# Patient Record
Sex: FEMALE | Race: WHITE | NOT HISPANIC OR LATINO | Employment: OTHER | URBAN - NONMETROPOLITAN AREA
[De-identification: names, ages, dates, MRNs, and addresses within clinical notes are randomized per-mention and may not be internally consistent; named-entity substitution may affect disease eponyms.]

---

## 2017-02-03 ENCOUNTER — OFFICE VISIT (OUTPATIENT)
Dept: OBSTETRICS AND GYNECOLOGY | Facility: CLINIC | Age: 52
End: 2017-02-03

## 2017-02-03 VITALS
HEIGHT: 64 IN | SYSTOLIC BLOOD PRESSURE: 158 MMHG | DIASTOLIC BLOOD PRESSURE: 60 MMHG | WEIGHT: 208 LBS | BODY MASS INDEX: 35.51 KG/M2

## 2017-02-03 DIAGNOSIS — Z01.419 ENCOUNTER FOR GYNECOLOGICAL EXAMINATION WITHOUT ABNORMAL FINDING: Primary | ICD-10-CM

## 2017-02-03 PROCEDURE — 99386 PREV VISIT NEW AGE 40-64: CPT | Performed by: OBSTETRICS & GYNECOLOGY

## 2017-02-03 NOTE — PROGRESS NOTES
Subjective   Chief Complaint   Patient presents with   • Gynecologic Exam     Last pap  WNL Mammogram  Normal     Kristyn Thomas is a 51 y.o. year old  (SVDx 1, C/S x 1) presenting to be seen for her annual exam.  PCP Reymundo, PAP , MMG , GSUI, Ablation --no periods since then. LABS reviewed and noted sig elevate TG and Cholesterol.   SEXUAL Hx:  She is currently sexually active.  In the past year there has not been new sexual partners.    Condoms are not typically used.  She would not like to be screened for STD's at today's exam.  Current birth control method: abstinence.  MENSTRUAL Hx:  No LMP recorded. Patient has had an ablation.  In the past 6 months her cycles have been absent.   Her menstrual flow is absent.   Each month on average there are roughly 0 days of very heavy flow.    Intermenstrual bleeding is absent.    Post-coital bleeding is absent.  Dysmenorrhea: none and is not affecting her activities of daily living  PMS: none and is not affecting her activities of daily living  Her cycles are not a source of concern for her that she wishes to discuss today.  HEALTH Hx:  She exercises regularly: no (and has no plans to become more active).  She wears her seat belt:yes.  She has concerns about domestic violence: no.  OTHER COMPLAINTS:  none    The following portions of the patient's history were reviewed and updated as appropriate:problem list, current medications, allergies, past family history, past medical history, past social history and past surgical history.    Smoking status: Never Smoker                                                                 Smokeless status: Not on file                       Review of Systems  Consitutional NEG: anorexia or night sweats    POS: nothing reported   Gastointestinal NEG: bloating, change in bowel habits, melena or reflux symptoms    POS: nothing reported   Genitourinary NEG: dysuria or hematuria    POS: nothing reported   Integument  "NEG: moles that are changing in size, shape, color or rashes    POS: nothing reported   Breast NEG: persistent breast lump, skin dimpling or nipple discharge    POS: nothing reported          Objective   Visit Vitals   • /60   • Ht 64\" (162.6 cm)   • Wt 208 lb (94.3 kg)   • BMI 35.7 kg/m2       General:  well developed; well nourished  no acute distress   Skin:  No suspicious lesions seen   Thyroid: normal to inspection and palpation   Breasts:  Examined in supine position  Symmetric without masses or skin dimpling  Nipples normal without inversion, lesions or discharge  There are no palpable axillary nodes   Abdomen: soft, non-tender; no masses  no umbilical or inginual hernias are present  no hepato-splenomegaly   Pelvis: Clinical staff was present for exam  External genitalia:  normal appearance of the external genitalia including [place]th[place]'s and [place]'s glands.  :  urethral meatus normal; urethral hypermobility is absent.  Vaginal:  normal pink mucosa without prolapse or lesions.  Cervix:  normal appearance.  Uterus:  normal size, shape and consistency. asymmetrically enlarged, consistent with 10 weeks size;  Adnexa:  enlarged adnexa, left - see above;        Assessment   1. Normal PE     Plan   1. PAP done, TVS 4 weeks to eval h/o fibroids  2. Meds were prescribed - no  3.     No orders of the defined types were placed in this encounter.         This note was electronically signed.      February 3, 2017    "

## 2017-02-03 NOTE — PATIENT INSTRUCTIONS
Health Maintenance, Female  Adopting a healthy lifestyle and getting preventive care can go a long way to promote health and wellness. Talk with your health care provider about what schedule of regular examinations is right for you. This is a good chance for you to check in with your provider about disease prevention and staying healthy.  In between checkups, there are plenty of things you can do on your own. Experts have done a lot of research about which lifestyle changes and preventive measures are most likely to keep you healthy. Ask your health care provider for more information.  WEIGHT AND DIET   Eat a healthy diet  · Be sure to include plenty of vegetables, fruits, low-fat dairy products, and lean protein.  · Do not eat a lot of foods high in solid fats, added sugars, or salt.  · Get regular exercise. This is one of the most important things you can do for your health.  ¨ Most adults should exercise for at least 150 minutes each week. The exercise should increase your heart rate and make you sweat (moderate-intensity exercise).  ¨ Most adults should also do strengthening exercises at least twice a week. This is in addition to the moderate-intensity exercise.    Maintain a healthy weight  · Body mass index (BMI) is a measurement that can be used to identify possible weight problems. It estimates body fat based on height and weight. Your health care provider can help determine your BMI and help you achieve or maintain a healthy weight.  · For females 20 years of age and older:      A BMI below 18.5 is considered underweight.    A BMI of 18.5 to 24.9 is normal.    A BMI of 25 to 29.9 is considered overweight.    A BMI of 30 and above is considered obese.    Watch levels of cholesterol and blood lipids  · You should start having your blood tested for lipids and cholesterol at 20 years of age, then have this test every 5 years.  · You may need to have your cholesterol levels checked more often if:  ¨ Your lipid  or cholesterol levels are high.  ¨ You are older than 50 years of age.  ¨ You are at high risk for heart disease.    CANCER SCREENING      Lung Cancer  · Lung cancer screening is recommended for adults 55-80 years old who are at high risk for lung cancer because of a history of smoking.  · A yearly low-dose CT scan of the lungs is recommended for people who:  ¨ Currently smoke.  ¨ Have quit within the past 15 years.  ¨ Have at least a 30-pack-year history of smoking. A pack year is smoking an average of one pack of cigarettes a day for 1 year.  · Yearly screening should continue until it has been 15 years since you quit.  · Yearly screening should stop if you develop a health problem that would prevent you from having lung cancer treatment.    Breast Cancer  · Practice breast self-awareness. This means understanding how your breasts normally appear and feel.  · It also means doing regular breast self-exams. Let your health care provider know about any changes, no matter how small.  · If you are in your 20s or 30s, you should have a clinical breast exam (CBE) by a health care provider every 1-3 years as part of a regular health exam.  · If you are 40 or older, have a CBE every year. Also consider having a breast X-ray (mammogram) every year.  · If you have a family history of breast cancer, talk to your health care provider about genetic screening.  · If you are at high risk for breast cancer, talk to your health care provider about having an MRI and a mammogram every year.  · Breast cancer gene (BRCA) assessment is recommended for women who have family members with BRCA-related cancers. BRCA-related cancers include:  ¨ Breast.  ¨ Ovarian.  ¨ Tubal.  ¨ Peritoneal cancers.  · Results of the assessment will determine the need for genetic counseling and BRCA1 and BRCA2 testing.  Cervical Cancer  Your health care provider may recommend that you be screened regularly for cancer of the pelvic organs (ovaries, uterus, and  vagina). This screening involves a pelvic examination, including checking for microscopic changes to the surface of your cervix (Pap test). You may be encouraged to have this screening done every 3 years, beginning at age 21.  · For women ages 30-65, health care providers may recommend pelvic exams and Pap testing every 3 years, or they may recommend the Pap and pelvic exam, combined with testing for human papilloma virus (HPV), every 5 years. Some types of HPV increase your risk of cervical cancer. Testing for HPV may also be done on women of any age with unclear Pap test results.  · Other health care providers may not recommend any screening for nonpregnant women who are considered low risk for pelvic cancer and who do not have symptoms. Ask your health care provider if a screening pelvic exam is right for you.  · If you have had past treatment for cervical cancer or a condition that could lead to cancer, you need Pap tests and screening for cancer for at least 20 years after your treatment. If Pap tests have been discontinued, your risk factors (such as having a new sexual partner) need to be reassessed to determine if screening should resume. Some women have medical problems that increase the chance of getting cervical cancer. In these cases, your health care provider may recommend more frequent screening and Pap tests.  Colorectal Cancer  · This type of cancer can be detected and often prevented.  · Routine colorectal cancer screening usually begins at 50 years of age and continues through 75 years of age.  · Your health care provider may recommend screening at an earlier age if you have risk factors for colon cancer.  · Your health care provider may also recommend using home test kits to check for hidden blood in the stool.  · A small camera at the end of a tube can be used to examine your colon directly (sigmoidoscopy or colonoscopy). This is done to check for the earliest forms of colorectal  cancer.  · Routine screening usually begins at age 50.  · Direct examination of the colon should be repeated every 5-10 years through 75 years of age. However, you may need to be screened more often if early forms of precancerous polyps or small growths are found.  Skin Cancer  · Check your skin from head to toe regularly.  · Tell your health care provider about any new moles or changes in moles, especially if there is a change in a mole's shape or color.  · Also tell your health care provider if you have a mole that is larger than the size of a pencil eraser.  · Always use sunscreen. Apply sunscreen liberally and repeatedly throughout the day.  · Protect yourself by wearing long sleeves, pants, a wide-brimmed hat, and sunglasses whenever you are outside.  HEART DISEASE, DIABETES, AND HIGH BLOOD PRESSURE   · High blood pressure causes heart disease and increases the risk of stroke. High blood pressure is more likely to develop in:    People who have blood pressure in the high end of the normal range (130-139/85-89 mm Hg).    People who are overweight or obese.    People who are .  · If you are 18-39 years of age, have your blood pressure checked every 3-5 years. If you are 40 years of age or older, have your blood pressure checked every year. You should have your blood pressure measured twice--once when you are at a hospital or clinic, and once when you are not at a hospital or clinic. Record the average of the two measurements. To check your blood pressure when you are not at a hospital or clinic, you can use:    An automated blood pressure machine at a pharmacy.    A home blood pressure monitor.  · If you are between 55 years and 79 years old, ask your health care provider if you should take aspirin to prevent strokes.  · Have regular diabetes screenings. This involves taking a blood sample to check your fasting blood sugar level.    If you are at a normal weight and have a low risk for diabetes,  have this test once every three years after 45 years of age.    If you are overweight and have a high risk for diabetes, consider being tested at a younger age or more often.  PREVENTING INFECTION   Hepatitis B  · If you have a higher risk for hepatitis B, you should be screened for this virus. You are considered at high risk for hepatitis B if:    You were born in a country where hepatitis B is common. Ask your health care provider which countries are considered high risk.    Your parents were born in a high-risk country, and you have not been immunized against hepatitis B (hepatitis B vaccine).    You have HIV or AIDS.    You use needles to inject street drugs.    You live with someone who has hepatitis B.    You have had sex with someone who has hepatitis B.    You get hemodialysis treatment.    You take certain medicines for conditions, including cancer, organ transplantation, and autoimmune conditions.  Hepatitis C  · Blood testing is recommended for:  ¨ Everyone born from 1945 through 1965.  ¨ Anyone with known risk factors for hepatitis C.  Sexually transmitted infections (STIs)  · You should be screened for sexually transmitted infections (STIs) including gonorrhea and chlamydia if:  ¨ You are sexually active and are younger than 24 years of age.  ¨ You are older than 24 years of age and your health care provider tells you that you are at risk for this type of infection.  ¨ Your sexual activity has changed since you were last screened and you are at an increased risk for chlamydia or gonorrhea. Ask your health care provider if you are at risk.    If you do not have HIV, but are at risk, it may be recommended that you take a prescription medicine daily to prevent HIV infection. This is called pre-exposure prophylaxis (PrEP). You are considered at risk if:    You are sexually active and do not regularly use condoms or know the HIV status of your partner(s).    You take drugs by injection.    You are sexually  active with a partner who has HIV.  Talk with your health care provider about whether you are at high risk of being infected with HIV. If you choose to begin PrEP, you should first be tested for HIV. You should then be tested every 3 months for as long as you are taking PrEP.   PREGNANCY   · If you are premenopausal and you may become pregnant, ask your health care provider about preconception counseling.  · If you may become pregnant, take 400 to 800 micrograms (mcg) of folic acid every day.  · If you want to prevent pregnancy, talk to your health care provider about birth control (contraception).  OSTEOPOROSIS AND MENOPAUSE   · Osteoporosis is a disease in which the bones lose minerals and strength with aging. This can result in serious bone fractures. Your risk for osteoporosis can be identified using a bone density scan.  · If you are 65 years of age or older, or if you are at risk for osteoporosis and fractures, ask your health care provider if you should be screened.  · Ask your health care provider whether you should take a calcium or vitamin D supplement to lower your risk for osteoporosis.  · Menopause may have certain physical symptoms and risks.  · Hormone replacement therapy may reduce some of these symptoms and risks.  Talk to your health care provider about whether hormone replacement therapy is right for you.   HOME CARE INSTRUCTIONS   · Schedule regular health, dental, and eye exams.  · Stay current with your immunizations.    · Do not use any tobacco products including cigarettes, chewing tobacco, or electronic cigarettes.  · If you are pregnant, do not drink alcohol.  · If you are breastfeeding, limit how much and how often you drink alcohol.  · Limit alcohol intake to no more than 1 drink per day for nonpregnant women. One drink equals 12 ounces of beer, 5 ounces of wine, or 1½ ounces of hard liquor.  · Do not use street drugs.  · Do not share needles.  · Ask your health care provider for help if  you need support or information about quitting drugs.  · Tell your health care provider if you often feel depressed.  · Tell your health care provider if you have ever been abused or do not feel safe at home.     This information is not intended to replace advice given to you by your health care provider. Make sure you discuss any questions you have with your health care provider.     Document Released: 07/02/2012 Document Revised: 01/08/2016 Document Reviewed: 11/19/2014  [employer] Interactive Patient Education ©2016 [employer]

## 2017-02-28 ENCOUNTER — HOSPITAL ENCOUNTER (OUTPATIENT)
Dept: MAMMOGRAPHY | Facility: HOSPITAL | Age: 52
Discharge: HOME | End: 2017-02-28
Attending: OBSTETRICS & GYNECOLOGY | Admitting: OBSTETRICS & GYNECOLOGY

## 2017-02-28 DIAGNOSIS — Z01.419 ENCOUNTER FOR GYNECOLOGICAL EXAMINATION WITHOUT ABNORMAL FINDING: ICD-10-CM

## 2017-02-28 PROCEDURE — G0202 SCR MAMMO BI INCL CAD: HCPCS

## 2017-02-28 PROCEDURE — 77063 BREAST TOMOSYNTHESIS BI: CPT

## 2017-03-02 ENCOUNTER — OFFICE VISIT (OUTPATIENT)
Dept: OBSTETRICS AND GYNECOLOGY | Facility: CLINIC | Age: 52
End: 2017-03-02

## 2017-03-02 VITALS
SYSTOLIC BLOOD PRESSURE: 136 MMHG | WEIGHT: 208 LBS | DIASTOLIC BLOOD PRESSURE: 66 MMHG | HEIGHT: 64 IN | BODY MASS INDEX: 35.51 KG/M2

## 2017-03-02 DIAGNOSIS — D25.9 UTERINE LEIOMYOMA, UNSPECIFIED LOCATION: Primary | ICD-10-CM

## 2017-03-02 PROCEDURE — 99213 OFFICE O/P EST LOW 20 MIN: CPT | Performed by: OBSTETRICS & GYNECOLOGY

## 2017-03-02 PROCEDURE — 76830 TRANSVAGINAL US NON-OB: CPT | Performed by: OBSTETRICS & GYNECOLOGY

## 2017-03-02 NOTE — PROGRESS NOTES
"Subjective   Chief Complaint   Patient presents with   • Follow-up     TVS     Kristyn Thomas is a 52 y.o. year old .  No LMP recorded. Patient has had an ablation.  She presents to be seen because of history of fibroids, and is here for transvaginal ultrasound..     OTHER COMPLAINTS:  none    The following portions of the patient's history were reviewed and updated as appropriate:problem list, current medications, allergies, past medical history and past surgical history    Smoking status: Never Smoker                                                                 Smokeless status: Not on file                       Review of Systems  Consitutional POS: nothing reported    NEG: anorexia or night sweats   Gastointestinal POS: nothing reported    NEG: bloating, change in bowel habits, melena or reflux symptoms   Genitourinary POS: nothing reported    NEG: dysuria or hematuria   Integument POS: nothing reported    NEG: moles that are changing in size, shape, color or rashes   Breast POS: nothing reported    NEG: persistent breast lump, skin dimpling or nipple discharge         Objective   Visit Vitals   • /66   • Ht 64\" (162.6 cm)   • Wt 208 lb (94.3 kg)   • BMI 35.7 kg/m2       General:  well developed; well nourished  no acute distress  appears stated age   Skin:  Not performed.   Thyroid: not examined   Lungs:  Not performed.  breathing is unlabored   Heart:  Not performed.   Breasts:  Not performed.   Abdomen: Not performed.  no umbilical or inginual hernias are present  no hepato-splenomegaly   Pelvis: Not performed.     Lab Review   No data reviewed    Imaging   Pelvic ultrasound images independantly reviewed - Endometrium is thin consistent with prior ablation in menopausal status.  There are no adnexal masses.  Small fibroids are noted however overall uterine dimensions are small.        Assessment   1. Small fibroids within the uterus overall consistent with menopausal status and prior " ablation.     Plan   1. Follow-up when necessary.  Mammogram reviewed and normal last month.  2.     No orders of the defined types were placed in this encounter.         This note was electronically signed.      March 2, 2017

## 2017-03-02 NOTE — PATIENT INSTRUCTIONS
Uterine Fibroids  Uterine fibroids are tissue masses (tumors). They are also called leiomyomas. They can develop inside of a woman's womb (uterus). They can grow very large. Fibroids are not cancerous (benign). Most fibroids do not require medical treatment.  HOME CARE  · Keep all follow-up visits as told by your doctor. This is important.  · Take medicines only as told by your doctor.    If you were prescribed a hormone treatment, take the hormone medicines exactly as told.    Do not take aspirin. It can cause bleeding.  · Ask your doctor about taking iron pills and increasing the amount of dark green, leafy vegetables in your diet. These actions can help to boost your blood iron levels.  · Pay close attention to your period. Tell your doctor about any changes, such as:    Increased blood flow. This may require you to use more pads or tampons than usual per month.    A change in the number of days that your period lasts per month.    A change in symptoms that come with your period, such as back pain or cramping in your belly area (abdomen).  GET HELP IF:  · You have pain in your back or the area between your hip bones (pelvic area) that is not controlled by medicines.  · You have pain in your abdomen that is not controlled with medicines.  · You have an increase in bleeding between and during periods.  · You soak tampons or pads in a half hour or less.  · You feel lightheaded.  · You feel extra tired.  · You feel weak.  GET HELP RIGHT AWAY IF:   · You pass out (faint).  · You have a sudden increase in pelvic pain.     This information is not intended to replace advice given to you by your health care provider. Make sure you discuss any questions you have with your health care provider.     Document Released: 01/20/2012 Document Revised: 01/08/2016 Document Reviewed: 06/16/2015  [employer] Interactive Patient Education ©2016 [employer]

## 2018-12-12 ENCOUNTER — OFFICE VISIT (OUTPATIENT)
Dept: OBSTETRICS AND GYNECOLOGY | Facility: CLINIC | Age: 53
End: 2018-12-12

## 2018-12-12 VITALS
BODY MASS INDEX: 34.22 KG/M2 | WEIGHT: 205.4 LBS | DIASTOLIC BLOOD PRESSURE: 84 MMHG | HEIGHT: 65 IN | SYSTOLIC BLOOD PRESSURE: 130 MMHG

## 2018-12-12 DIAGNOSIS — Z12.39 SCREENING FOR BREAST CANCER: ICD-10-CM

## 2018-12-12 DIAGNOSIS — R39.89 POSSIBLE URINARY TRACT INFECTION: ICD-10-CM

## 2018-12-12 DIAGNOSIS — R39.15 URINARY URGENCY: ICD-10-CM

## 2018-12-12 DIAGNOSIS — Z12.4 SCREENING FOR MALIGNANT NEOPLASM OF CERVIX: ICD-10-CM

## 2018-12-12 DIAGNOSIS — Z01.419 ENCOUNTER FOR GYNECOLOGICAL EXAMINATION WITHOUT ABNORMAL FINDING: Primary | ICD-10-CM

## 2018-12-12 LAB
BILIRUB BLD-MCNC: NEGATIVE MG/DL
CLARITY, POC: CLEAR
COLOR UR: NORMAL
GLUCOSE UR STRIP-MCNC: NEGATIVE MG/DL
KETONES UR QL: NEGATIVE
LEUKOCYTE EST, POC: NEGATIVE
NITRITE UR-MCNC: NEGATIVE MG/ML
PH UR: 6 [PH] (ref 5–8)
PROT UR STRIP-MCNC: NEGATIVE MG/DL
RBC # UR STRIP: NEGATIVE /UL
SP GR UR: 1.02 (ref 1–1.03)
UROBILINOGEN UR QL: NORMAL

## 2018-12-12 PROCEDURE — 99396 PREV VISIT EST AGE 40-64: CPT | Performed by: PHYSICIAN ASSISTANT

## 2018-12-12 RX ORDER — MONTELUKAST SODIUM 4 MG/1
TABLET, CHEWABLE ORAL
COMMUNITY
Start: 2018-11-27

## 2018-12-12 RX ORDER — INFLUENZA VIRUS VACCINE 15; 15; 15; 15 UG/.5ML; UG/.5ML; UG/.5ML; UG/.5ML
SUSPENSION INTRAMUSCULAR
COMMUNITY
Start: 2018-11-02

## 2018-12-12 NOTE — PROGRESS NOTES
"Subjective   Chief Complaint   Patient presents with   • Gynecologic Exam     Pap and MMG are both due; C/O urinary urgency       Kristyn Thomas is a 53 y.o. year old  presenting to be seen for her annual gynecological exam.   Her screening mammogram is due now  She reports having some urinary uregency for \"quite a while\" and last time she had this she had UTI and symptoms resolved with antibiotic  Having no dysuria   She has some occasional stress incontinence which she has had a long time. She has done Kegels in the past but didn't feel they helped  Previous endometrial ablation several years ago which stopped periods        History reviewed. No pertinent past medical history.     Current Outpatient Medications:   •  colestipol (COLESTID) 1 g tablet, , Disp: , Rfl:   •  FLUARIX QUADRIVALENT 0.5 ML suspension prefilled syringe injection, , Disp: , Rfl:    Allergies   Allergen Reactions   • Tyloxapol Hallucinations      Past Surgical History:   Procedure Laterality Date   • APPENDECTOMY     •  SECTION     • CHOLECYSTECTOMY     • ENDOMETRIAL ABLATION     • TONSILLECTOMY     • TUBAL ABDOMINAL LIGATION        Social History     Socioeconomic History   • Marital status: Unknown     Spouse name: Not on file   • Number of children: Not on file   • Years of education: Not on file   • Highest education level: Not on file   Social Needs   • Financial resource strain: Not on file   • Food insecurity - worry: Not on file   • Food insecurity - inability: Not on file   • Transportation needs - medical: Not on file   • Transportation needs - non-medical: Not on file   Occupational History   • Not on file   Tobacco Use   • Smoking status: Never Smoker   • Smokeless tobacco: Never Used   Substance and Sexual Activity   • Alcohol use: No   • Drug use: No   • Sexual activity: Yes     Partners: Male     Birth control/protection: Surgical   Other Topics Concern   • Not on file   Social History Narrative   • Not on file " "     Family History   Problem Relation Age of Onset   • Coronary artery disease Father    • Hypertension Father    • Osteoporosis Mother    • Diabetes Mother    • Coronary artery disease Mother    • Hypertension Mother    • Hyperlipidemia Mother    • Thyroid disease Mother    • Clotting disorder Mother        Review of Systems   Constitutional: Negative.    Gastrointestinal: Negative.    Genitourinary: Positive for frequency and urgency. Negative for difficulty urinating, dysuria and vaginal bleeding.           Objective   /84   Ht 163.8 cm (64.5\")   Wt 93.2 kg (205 lb 6.4 oz)   Breastfeeding? No   BMI 34.71 kg/m²     Physical Exam   Constitutional: She appears well-developed and well-nourished. She is cooperative.   Pulmonary/Chest: Right breast exhibits no inverted nipple, no mass, no nipple discharge, no skin change and no tenderness. Left breast exhibits no inverted nipple, no mass, no nipple discharge, no skin change and no tenderness.   Abdominal: Soft. Normal appearance. There is no tenderness.   Genitourinary: Uterus normal. There is no tenderness or lesion on the right labia. There is no tenderness or lesion on the left labia. Cervix exhibits no motion tenderness and no discharge. Right adnexum displays no mass and no tenderness. Left adnexum displays no mass and no tenderness. No tenderness in the vagina. No vaginal discharge found.   Genitourinary Comments: Pap done  Scant one plus cystocele and rectocele    Neurological: She is alert.   Skin: Skin is warm and dry.   Psychiatric: She has a normal mood and affect. Her behavior is normal.            Assessment and Plan  Kristyn was seen today for gynecologic exam.    Diagnoses and all orders for this visit:    Encounter for gynecological examination without abnormal finding    Screening for malignant neoplasm of cervix  -     Liquid-based Pap Smear, Screening; Future    Screening for breast cancer  -     Mammo Screening Digital Tomosynthesis " Bilateral With CAD; Future    Urinary urgency    Possible urinary tract infection  -     POC Urinalysis Dipstick, Automated      Patient Instructions   Self breast exam monthly  Annual mammogram  Calcium 1200 mg daily and vit D 2000 mg daily  Regular exercise  UA clear  Continue Kegels daily  Discussed option of trial of overactive bladder medication but patient does not want to try medication at this time               This note was electronically signed.    Dionna Smallwood PA-C   December 12, 2018

## 2018-12-12 NOTE — PATIENT INSTRUCTIONS
Self breast exam monthly  Annual mammogram  Calcium 1200 mg daily and vit D 2000 mg daily  Regular exercise  UA clear  Continue Kegels daily  Discussed option of trial of overactive bladder medication but patient does not want to try medication at this time

## 2018-12-18 DIAGNOSIS — Z12.4 SCREENING FOR MALIGNANT NEOPLASM OF CERVIX: ICD-10-CM

## 2018-12-28 ENCOUNTER — HOSPITAL ENCOUNTER (OUTPATIENT)
Dept: MAMMOGRAPHY | Facility: HOSPITAL | Age: 53
Discharge: HOME | End: 2018-12-28
Admitting: PHYSICIAN ASSISTANT

## 2018-12-28 DIAGNOSIS — Z12.39 SCREENING FOR BREAST CANCER: ICD-10-CM

## 2018-12-28 PROCEDURE — 77063 BREAST TOMOSYNTHESIS BI: CPT

## 2018-12-28 PROCEDURE — 77067 SCR MAMMO BI INCL CAD: CPT

## 2019-11-26 ENCOUNTER — TRANSCRIBE ORDERS (OUTPATIENT)
Dept: ADMINISTRATIVE | Facility: HOSPITAL | Age: 54
End: 2019-11-26

## 2019-11-26 DIAGNOSIS — Z12.31 ENCOUNTER FOR SCREENING MAMMOGRAM FOR MALIGNANT NEOPLASM OF BREAST: Primary | ICD-10-CM

## 2020-01-08 ENCOUNTER — HOSPITAL ENCOUNTER (OUTPATIENT)
Dept: MAMMOGRAPHY | Facility: HOSPITAL | Age: 55
Discharge: HOME | End: 2020-01-08
Admitting: PHYSICIAN ASSISTANT

## 2020-01-08 DIAGNOSIS — Z12.31 ENCOUNTER FOR SCREENING MAMMOGRAM FOR MALIGNANT NEOPLASM OF BREAST: ICD-10-CM

## 2020-01-08 PROCEDURE — 77063 BREAST TOMOSYNTHESIS BI: CPT

## 2020-01-08 PROCEDURE — 77067 SCR MAMMO BI INCL CAD: CPT

## 2020-01-21 ENCOUNTER — OFFICE VISIT (OUTPATIENT)
Dept: OBSTETRICS AND GYNECOLOGY | Facility: CLINIC | Age: 55
End: 2020-01-21

## 2020-01-21 VITALS
BODY MASS INDEX: 35.85 KG/M2 | HEIGHT: 64 IN | SYSTOLIC BLOOD PRESSURE: 142 MMHG | WEIGHT: 210 LBS | DIASTOLIC BLOOD PRESSURE: 80 MMHG

## 2020-01-21 DIAGNOSIS — Z12.4 SCREENING FOR CERVICAL CANCER: ICD-10-CM

## 2020-01-21 DIAGNOSIS — Z01.419 ENCOUNTER FOR GYNECOLOGICAL EXAMINATION WITHOUT ABNORMAL FINDING: Primary | ICD-10-CM

## 2020-01-21 DIAGNOSIS — R39.15 URGENCY OF URINATION: ICD-10-CM

## 2020-01-21 LAB
BILIRUB BLD-MCNC: NEGATIVE MG/DL
CLARITY, POC: CLEAR
COLOR UR: NORMAL
GLUCOSE UR STRIP-MCNC: NEGATIVE MG/DL
KETONES UR QL: NEGATIVE
LEUKOCYTE EST, POC: NEGATIVE
NITRITE UR-MCNC: NEGATIVE MG/ML
PH UR: 7 [PH] (ref 5–8)
PROT UR STRIP-MCNC: NEGATIVE MG/DL
RBC # UR STRIP: NEGATIVE /UL
SP GR UR: 1.01 (ref 1–1.03)
UROBILINOGEN UR QL: NORMAL

## 2020-01-21 PROCEDURE — 99396 PREV VISIT EST AGE 40-64: CPT | Performed by: PHYSICIAN ASSISTANT

## 2020-01-21 NOTE — PATIENT INSTRUCTIONS
Encourage self breast exam monthly  Annual mammogram  Vitamin D 3 2000 units daily  Regular excercise

## 2020-01-21 NOTE — PROGRESS NOTES
Subjective   Chief Complaint   Patient presents with   • Gynecologic Exam     Last pap 18 WNL, MMG 20 WNL. No complaints       Kristyn Thomas is a 54 y.o. year old  presenting to be seen for her annual gynecological exam.   She reports no complaints  She had endometrial ablation  with no bleeding post ablation  Had normal screening mammogram earlier this month  Has never had DEXA  No taking calcium or vitamin D 3    History reviewed. No pertinent past medical history.     Current Outpatient Medications:   •  colestipol (COLESTID) 1 g tablet, , Disp: , Rfl:   •  FLUARIX QUADRIVALENT 0.5 ML suspension prefilled syringe injection, , Disp: , Rfl:    Allergies   Allergen Reactions   • Tyloxapol Hallucinations      Past Surgical History:   Procedure Laterality Date   • APPENDECTOMY     •  SECTION     • CHOLECYSTECTOMY     • ENDOMETRIAL ABLATION     • TONSILLECTOMY     • TUBAL ABDOMINAL LIGATION        Social History     Socioeconomic History   • Marital status: Unknown     Spouse name: Not on file   • Number of children: Not on file   • Years of education: Not on file   • Highest education level: Not on file   Tobacco Use   • Smoking status: Never Smoker   • Smokeless tobacco: Never Used   Substance and Sexual Activity   • Alcohol use: No   • Drug use: No   • Sexual activity: Yes     Partners: Male     Birth control/protection: Surgical      Family History   Problem Relation Age of Onset   • Coronary artery disease Father    • Hypertension Father    • Osteoporosis Mother    • Diabetes Mother    • Coronary artery disease Mother    • Hypertension Mother    • Hyperlipidemia Mother    • Thyroid disease Mother    • Clotting disorder Mother        Review of Systems   Constitutional: Negative for activity change, chills and fever.   Gastrointestinal: Negative for abdominal pain, nausea and vomiting.   Genitourinary: Positive for urgency. Negative for difficulty urinating, dysuria, frequency,  "pelvic pain, vaginal bleeding and vaginal discharge.           Objective   /80   Ht 162.6 cm (64\")   Wt 95.3 kg (210 lb)   BMI 36.05 kg/m²     Physical Exam   Constitutional: She appears well-developed and well-nourished. She is cooperative. No distress.   Eyes: Conjunctivae, EOM and lids are normal.   Pulmonary/Chest: Right breast exhibits no inverted nipple, no mass, no nipple discharge, no skin change and no tenderness. Left breast exhibits no inverted nipple, no mass, no nipple discharge, no skin change and no tenderness.   Abdominal: Soft. Normal appearance. There is no tenderness.   Genitourinary: Uterus normal. There is no tenderness or lesion on the right labia. There is no tenderness or lesion on the left labia. Cervix exhibits no motion tenderness, no discharge and no friability. Right adnexum displays no mass and no tenderness. Left adnexum displays no mass and no tenderness. No erythema or tenderness in the vagina. No vaginal discharge found.   Genitourinary Comments: Pap done   Neurological: She is alert.   Skin: Skin is warm and dry. No lesion and no rash noted.   Psychiatric: She has a normal mood and affect. Her behavior is normal. Thought content normal.            Assessment and Plan  Kristyn was seen today for gynecologic exam.    Diagnoses and all orders for this visit:    Encounter for gynecological examination without abnormal finding    Screening for cervical cancer  -     Liquid-based Pap Smear, Screening; Future    Urgency of urination  -     POC Urinalysis Dipstick, Automated      Patient Instructions   Encourage self breast exam monthly  Annual mammogram  Vitamin D 3 2000 units daily  Regular excercise             This note was electronically signed.    Dionna Smallwood PA-C   January 21, 2020  "

## 2020-01-24 DIAGNOSIS — Z12.4 SCREENING FOR CERVICAL CANCER: ICD-10-CM

## 2024-07-01 NOTE — ADDENDUM NOTE
Addended by: JAGDEEP VOGEL on: 2/17/2017 04:18 PM     Modules accepted: Orders     Fax received from pharmacy requesting refill of omeprazole 20 MG. Last refill 6/1/23. Last office visit: 6/1/23.    Called patient no answer, left voicemail for patient to return call to Germania Kerr NP office at [phone].    ECO Representative: Please reach out to [facility] via Epic Secure Chat to see if a team member is available to take patient's call.    If team member is unavailable, please document in this encounter that patient returned call and route to: TRACY KERR NP FP NURSE MSG POOL [[phone]].     PATIENT NEEDS TO SCHEDULE HER CPE PRIOR TO REFILL.